# Patient Record
Sex: FEMALE | Race: WHITE | NOT HISPANIC OR LATINO
[De-identification: names, ages, dates, MRNs, and addresses within clinical notes are randomized per-mention and may not be internally consistent; named-entity substitution may affect disease eponyms.]

---

## 2021-11-03 ENCOUNTER — NON-APPOINTMENT (OUTPATIENT)
Age: 51
End: 2021-11-03

## 2021-12-20 ENCOUNTER — APPOINTMENT (OUTPATIENT)
Dept: UROGYNECOLOGY | Facility: CLINIC | Age: 51
End: 2021-12-20
Payer: COMMERCIAL

## 2021-12-20 VITALS
BODY MASS INDEX: 24.11 KG/M2 | TEMPERATURE: 97.1 F | HEIGHT: 66 IN | WEIGHT: 150 LBS | OXYGEN SATURATION: 100 % | DIASTOLIC BLOOD PRESSURE: 78 MMHG | SYSTOLIC BLOOD PRESSURE: 124 MMHG | HEART RATE: 91 BPM

## 2021-12-20 DIAGNOSIS — N81.2 INCOMPLETE UTEROVAGINAL PROLAPSE: ICD-10-CM

## 2021-12-20 DIAGNOSIS — Z80.0 FAMILY HISTORY OF MALIGNANT NEOPLASM OF DIGESTIVE ORGANS: ICD-10-CM

## 2021-12-20 DIAGNOSIS — Z83.3 FAMILY HISTORY OF DIABETES MELLITUS: ICD-10-CM

## 2021-12-20 PROBLEM — Z00.00 ENCOUNTER FOR PREVENTIVE HEALTH EXAMINATION: Status: ACTIVE | Noted: 2021-12-20

## 2021-12-20 PROCEDURE — 99204 OFFICE O/P NEW MOD 45 MIN: CPT

## 2021-12-21 PROBLEM — Z83.3 FAMILY HISTORY OF DIABETES MELLITUS: Status: ACTIVE | Noted: 2021-12-21

## 2021-12-21 PROBLEM — Z80.0 FAMILY HISTORY OF COLON CANCER: Status: ACTIVE | Noted: 2021-12-21

## 2021-12-21 NOTE — HISTORY OF PRESENT ILLNESS
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

## 2021-12-21 NOTE — DISCUSSION/SUMMARY
[FreeTextEntry1] : I reviewed the above findings with the patient with visual illustrations. Treatment options for the prolapse were discussed and included doing nothing, Kegel exercises and behavioral modification, a pessary, or surgical correction. Surgically we discussed the abdominal vs the vaginal routes. Abdominally we discussed a hysterectomy and a sacral colpopexy laparoscopically and robotically.  Vaginally we discussed a vaginal hysterectomy, uterosacral suspension, and anterior/posterior repair. Surgically we discussed hysteropexy as well as the use of biologics. \par \par I discussed that if she desires surgical management I would recommend performing a pelvic ultrasound and urodynamic testing. At this time, she would like to consider her options and will inform us of her decision. \par \par [facility] handout on Pelvic Organ Prolapse provided to patient.  She will follow up here in as-needed basis

## 2021-12-21 NOTE — PHYSICAL EXAM
[Chaperone Present] : A chaperone was present in the examining room during all aspects of the physical examination [Uterine Prolapse] : uterine prolapse [4] : 4 [Aa ____] : Aa [unfilled] [Ba ____] : Ba [unfilled] [C ____] : C [unfilled] [PB ____] : PB [unfilled] [TVL ____] : TVL  [unfilled] [Ap ____] : Ap [unfilled] [Bp ____] : Bp [unfilled] [No Acute Distress] : in no acute distress [Well developed] : well developed [Well Nourished] : ~L well nourished [Warm and Dry] : was warm and dry to touch [Normal Gait] : gait was normal [Labia Majora] : were normal [Labia Minora] : were normal [Normal Appearance] : general appearance was normal [No Bleeding] : there was no active vaginal bleeding [GH ____] : GH [unfilled] [D ____] : D [unfilled] [Normal] : no abnormalities [Normal rectal exam] : was normal [Tenderness] : ~T no ~M abdominal tenderness observed [Distended] : not distended